# Patient Record
Sex: FEMALE
[De-identification: names, ages, dates, MRNs, and addresses within clinical notes are randomized per-mention and may not be internally consistent; named-entity substitution may affect disease eponyms.]

---

## 2024-08-01 PROBLEM — Z00.00 ENCOUNTER FOR PREVENTIVE HEALTH EXAMINATION: Status: ACTIVE | Noted: 2024-08-01

## 2024-08-08 ENCOUNTER — APPOINTMENT (OUTPATIENT)
Dept: ORTHOPEDIC SURGERY | Facility: CLINIC | Age: 59
End: 2024-08-08

## 2024-08-08 PROBLEM — I10 HBP (HIGH BLOOD PRESSURE): Status: ACTIVE | Noted: 2024-08-08

## 2024-08-08 PROBLEM — M23.91 INTERNAL DERANGEMENT OF RIGHT KNEE: Status: ACTIVE | Noted: 2024-08-08

## 2024-08-08 PROBLEM — F17.210 CIGARETTE SMOKER: Status: ACTIVE | Noted: 2024-08-08

## 2024-08-08 PROBLEM — F32.A DEPRESSION: Status: ACTIVE | Noted: 2024-08-08

## 2024-08-08 PROBLEM — M25.561 RIGHT MEDIAL KNEE PAIN: Status: ACTIVE | Noted: 2024-08-08

## 2024-08-08 PROCEDURE — 73564 X-RAY EXAM KNEE 4 OR MORE: CPT | Mod: RT

## 2024-08-08 PROCEDURE — 73030 X-RAY EXAM OF SHOULDER: CPT | Mod: RT

## 2024-08-08 PROCEDURE — 20610 DRAIN/INJ JOINT/BURSA W/O US: CPT

## 2024-08-08 PROCEDURE — 99204 OFFICE O/P NEW MOD 45 MIN: CPT | Mod: 25

## 2024-08-08 PROCEDURE — 73010 X-RAY EXAM OF SHOULDER BLADE: CPT | Mod: RT

## 2024-08-08 NOTE — DISCUSSION/SUMMARY
[de-identified] : Discussed PT/ CSI R knee Plan for PT If no improvement with inj will call to obtain MRI to r/o MMT ----------------------------------------------------------------------------   All relevant imaging studies pertinent to today's visit, including x-rays, MRI's and/or other advanced imaging studies (CT/etc) were independently interpreted and reviewed with the patient as needed. Implications of the studies together with the patient's clinical picture were discussed to formulate a working diagnosis and management options were detailed.   The patient and/or guardian was advised of the diagnosis.  The natural history of the pathology was explained in full. All questions were answered.  The risks and benefits of conservative and interventional treatment alternatives were explained to the patient  The patient and/or guardian was advised if any advanced diagnostic/imaging study (MRI/CT/etc) is ordered to evaluate potential pathology in the affected area(s), they should follow up in the office to review the results of the study and determine further management that may be indicated.     ----------------------------------------------------------------------------  Large joint corticosteroid injection given: Right knee  Patient indicated for injection after trial of rest, OTC medications including aspirin, Ibuprofen, Aleve etc or prescription NSAIDS, and/or exercises at home and/ or physical therapy without satisfactory response.  Patient has symptoms including pain, swelling, and/or decreased mobility in the joint. The risks, benefits, and alternatives to corticosteroid injection were explained in full to the patient, including but not limited to infection, sepsis, bleeding, scarring, skin discoloration, temporary increase in pain, syncopal episode, failure to resolve symptoms, allergic reaction, symptom recurrence, and elevation of blood sugar in diabetics. Patient understood the risks. All questions were answered. After discussion of options, patient requested an injection.   Oral informed consent was obtained and sterile technique was utilized for the procedure including the preparation of the solutions used for the injection and betadine followed by alcohol prep to the injection site. Anesthesia was given with ethyl chloride sprayed topically. The injection was delivered. Patient tolerated the procedure well.   Post Procedure Instructions: Patient was advised to call if redness, pain, or fever occur and apply ice for 15 min on and 15 min off later today  Medications delivered: Kenalog 10 mg, Lidocaine: 4 cc

## 2024-08-08 NOTE — IMAGING
[All Views] : anteroposterior, lateral, skyline, and anteroposterior standing [Mild tricompartmental OA medial narrowing] : Mild tricompartmental OA medial narrowing [Advanced patellofemoral OA] : Advanced patellofemoral OA [de-identified] :   ----------------------------------------------------------------------------   Right knee exam:   Skin: no significant pertinent finding  Inspection:     (neg) Effusion     (neg) Malalignment     (neg) Swelling     (neg) Quad atrophy     (neg) J-sign  ROM:     0 - 135 degrees of flexion.  Tenderness:     (+) MJLT     (mild) LJLT     (neg) Medial patellar facet tenderness     (neg) Lateral patellar facet tenderness     (+) Crepitus     (min) Patellar grind tenderness     (neg) Patellar tendon     (neg) Quad tendon     Other:  Stability:     (neg) Lachman     (neg) Varus/Valgus instability     (neg) Posterior drawer     (neg) Patellar translation: wnl  Additional tests:     (+) McMurrays test     (neg) Patellar apprehension     Other:  Strength: 5/5 Q/H/TA/GS/EHL  Neuro: In tact to light touch throughout, DTR's wnl  Vascularity: Extremity warm and well perfused  Gait: antalgic      ----------------------------------------------------------------------------   Right shoulder exam:   Skin: no significant pertinent finding Inspection: no obvious deformity, no obvious masses, no swelling, no effusion, no atrophy ROM:    FF: 180    ER: 70    IR: T12 Tenderness:    (neg) Anterior/Biceps:    (neg) Posterior    (neg) Lateral    (neg) Trapezius    (neg) Scapula    (neg) AC joint    (neg) Crepitus with ROM Stability:    (neg) Translation    (neg) Apprehension    (neg) Clicking Additional tests:    (neg) Neer's    (neg) Hawkin's    (neg) Fitzpatrick's    (neg) Speed    (neg) Cross chest adduction Strength:    FF: 5/5    ER: 5/5  + painful    IR: 5/5    Biceps: 5/5    Triceps: 5/5    Distal: 5/5 Neuro: In tact to light touch throughout Vascularity: Extremity warm and well perfused   [Right] : right shoulder [AC Joint Arthrosis] : AC Joint Arthrosis [There are no fractures, subluxations or dislocations. No significant abnormalities are seen] : There are no fractures, subluxations or dislocations. No significant abnormalities are seen

## 2024-08-08 NOTE — HISTORY OF PRESENT ILLNESS
[] : no [de-identified] : MARGARITA HALL  a 59 year F  here for evaluation of her right shoulder and knee. Patient has a previous work shoulder injury from about 10 years ago, pain had started again recently; has since decreased. Pain in the right knee started about a month ago and has worsened over time, pain is worse at night. Patient was seen in the ER a few weeks ago where she had an x-ray and ultrasound at [facility]; was told to follow up with an orthopedist. Tylenol improves discomfort. having intermittent locking sx. pain with twisting/ turning    no dm/ glaucoma

## 2024-09-09 ENCOUNTER — APPOINTMENT (OUTPATIENT)
Dept: ORTHOPEDIC SURGERY | Facility: CLINIC | Age: 59
End: 2024-09-09

## 2024-09-10 ENCOUNTER — APPOINTMENT (OUTPATIENT)
Dept: ORTHOPEDIC SURGERY | Facility: CLINIC | Age: 59
End: 2024-09-10

## 2024-09-12 ENCOUNTER — APPOINTMENT (OUTPATIENT)
Dept: ORTHOPEDIC SURGERY | Facility: CLINIC | Age: 59
End: 2024-09-12
Payer: MEDICAID

## 2024-09-12 VITALS — BODY MASS INDEX: 32.02 KG/M2 | WEIGHT: 174 LBS | HEIGHT: 62 IN

## 2024-09-12 DIAGNOSIS — M25.561 PAIN IN RIGHT KNEE: ICD-10-CM

## 2024-09-12 DIAGNOSIS — M23.91 UNSPECIFIED INTERNAL DERANGEMENT OF RIGHT KNEE: ICD-10-CM

## 2024-09-12 PROCEDURE — 99214 OFFICE O/P EST MOD 30 MIN: CPT

## 2024-09-12 PROCEDURE — 73560 X-RAY EXAM OF KNEE 1 OR 2: CPT | Mod: RT

## 2024-09-12 RX ORDER — DICLOFENAC SODIUM 75 MG/1
75 TABLET, DELAYED RELEASE ORAL
Qty: 20 | Refills: 1 | Status: ACTIVE | COMMUNITY
Start: 2024-09-12 | End: 1900-01-01

## 2024-09-12 NOTE — HISTORY OF PRESENT ILLNESS
[10] : 10 [3] : 3 [de-identified] : MARGARITA HALL  a 59 year F  here for evaluation of her right shoulder and knee. Patient has a previous work shoulder injury from about 10 years ago, pain had started again recently; has since decreased. Pain in the right knee started about a month ago and has worsened over time, pain is worse at night. Patient was seen in the ER a few weeks ago where she had an x-ray and ultrasound at [facility]; was told to follow up with an orthopedist. Tylenol improves discomfort. having intermittent locking sx. pain with twisting/ turning    no dm/ glaucoma  [] : no [de-identified] : PT

## 2024-09-12 NOTE — IMAGING
[AC Joint Arthrosis] : AC Joint Arthrosis [Right] : right knee [All Views] : anteroposterior, lateral, skyline, and anteroposterior standing [There are no fractures, subluxations or dislocations. No significant abnormalities are seen] : There are no fractures, subluxations or dislocations. No significant abnormalities are seen [Mild tricompartmental OA medial narrowing] : Mild tricompartmental OA medial narrowing [de-identified] :   ----------------------------------------------------------------------------   Right knee exam:   Skin: no significant pertinent finding  Inspection:     (mild ) Effusion     (neg) Malalignment     (+) Swelling     (neg) Quad atrophy     (neg) J-sign  ROM:     0 - 30 degrees of flexion.  Tenderness:     (+) MJLT     (mild) LJLT     (neg) Medial patellar facet tenderness     (neg) Lateral patellar facet tenderness     (+) Crepitus     (min) Patellar grind tenderness     (neg) Patellar tendon     (neg) Quad tendon     Other: medial tib plat  Stability:     (neg) Lachman     (+) pain with valgus     (neg) Posterior drawer     (neg) Patellar translation: wnl  Additional tests:     (+) McMurrays test     (neg) Patellar apprehension     Other:  Strength: 5/5 Q/H/TA/GS/EHL  Neuro: In tact to light touch throughout, DTR's wnl  Vascularity: Extremity warm and well perfused  Gait: antalgic with crutches       ----------------------------------------------------------------------------   Right shoulder exam:   Skin: no significant pertinent finding Inspection: no obvious deformity, no obvious masses, no swelling, no effusion, no atrophy ROM:    FF: 180    ER: 70    IR: T12 Tenderness:    (neg) Anterior/Biceps:    (neg) Posterior    (neg) Lateral    (neg) Trapezius    (neg) Scapula    (neg) AC joint    (neg) Crepitus with ROM Stability:    (neg) Translation    (neg) Apprehension    (neg) Clicking Additional tests:    (neg) Neer's    (neg) Hawkin's    (neg) Fitzpatrick's    (neg) Speed    (neg) Cross chest adduction Strength:    FF: 5/5    ER: 5/5  + painful    IR: 5/5    Biceps: 5/5    Triceps: 5/5    Distal: 5/5 Neuro: In tact to light touch throughout Vascularity: Extremity warm and well perfused

## 2024-09-12 NOTE — DISCUSSION/SUMMARY
[Medication Risks Reviewed] : Medication risks reviewed [de-identified] : Pt has locked knee and unable to flex. MRI indicated to r/o internal derangement/loose body/ bucket tear Rx: Diclofenac fu p mri  ----------------------------------------------------------------------------   All relevant imaging studies pertinent to today's visit, including x-rays, MRI's and/or other advanced imaging studies (CT/etc) were independently interpreted and reviewed with the patient as needed. Implications of the studies together with the patient's clinical picture were discussed to formulate a working diagnosis and management options were detailed.   The patient and/or guardian was advised of the diagnosis.  The natural history of the pathology was explained in full. All questions were answered.  The risks and benefits of conservative and interventional treatment alternatives were explained to the patient  The patient and/or guardian was advised if any advanced diagnostic/imaging study (MRI/CT/etc) is ordered to evaluate potential pathology in the affected area(s), they should follow up in the office to review the results of the study and determine further management that may be indicated.     ----------------------------------------------------------------------------  Patient warned of specific risks of medication related to bleeding, GI issues, increase blood pressure, and cardiac risks in addition to additional risks.  Patient advised to discuss with PMD  if any presence of stated issues.

## 2024-09-12 NOTE — REASON FOR VISIT
[FreeTextEntry2] : follow up - RT shoulder RT knee patient fell recently. was doing significantly better with csi and PT until this weekend the knee buckled and has been in severe pain since. went to ED and got immobilizer and crutches. feels she cannot bend knee. feels stuck

## 2024-09-20 ENCOUNTER — APPOINTMENT (OUTPATIENT)
Dept: ORTHOPEDIC SURGERY | Facility: CLINIC | Age: 59
End: 2024-09-20

## 2024-09-24 ENCOUNTER — APPOINTMENT (OUTPATIENT)
Dept: MRI IMAGING | Facility: CLINIC | Age: 59
End: 2024-09-24
Payer: MEDICAID

## 2024-09-24 PROCEDURE — 73721 MRI JNT OF LWR EXTRE W/O DYE: CPT | Mod: RT

## 2024-10-03 ENCOUNTER — APPOINTMENT (OUTPATIENT)
Dept: ORTHOPEDIC SURGERY | Facility: CLINIC | Age: 59
End: 2024-10-03
Payer: MEDICAID

## 2024-10-03 DIAGNOSIS — M23.306 OTHER MENISCUS DERANGEMENTS, UNSPECIFIED MENISCUS, RIGHT KNEE: ICD-10-CM

## 2024-10-03 PROCEDURE — 99214 OFFICE O/P EST MOD 30 MIN: CPT

## 2024-10-03 NOTE — HISTORY OF PRESENT ILLNESS
[10] : 10 [3] : 3 [de-identified] : MARGARITA HALL  a 59 year F  here for evaluation of her right shoulder and knee. Patient has a previous work shoulder injury from about 10 years ago, pain had started again recently; has since decreased. Pain in the right knee started about a month ago and has worsened over time, pain is worse at night. Patient was seen in the ER a few weeks ago where she had an x-ray and ultrasound at [facility]; was told to follow up with an orthopedist. Tylenol improves discomfort. having intermittent locking sx. pain with twisting/ turning    no dm/ glaucoma  [] : no [de-identified] : PT

## 2024-10-03 NOTE — DATA REVIEWED
[MRI] : MRI [Right] : of the right [Knee] : knee [I independently reviewed and interpreted images and report] : I independently reviewed and interpreted images and report [FreeTextEntry1] : complete posterior root medial meniscus tear

## 2024-10-03 NOTE — IMAGING
[AC Joint Arthrosis] : AC Joint Arthrosis [Right] : right knee [All Views] : anteroposterior, lateral, skyline, and anteroposterior standing [There are no fractures, subluxations or dislocations. No significant abnormalities are seen] : There are no fractures, subluxations or dislocations. No significant abnormalities are seen [Mild tricompartmental OA medial narrowing] : Mild tricompartmental OA medial narrowing [de-identified] :   ----------------------------------------------------------------------------   Right knee exam:   Skin: no significant pertinent finding  Inspection:     (min) Effusion     (neg) Malalignment     (+) Swelling     (neg) Quad atrophy     (neg) J-sign  ROM:     0 - 30 degrees of flexion.  Tenderness:     (+) MJLT     (mild) LJLT     (neg) Medial patellar facet tenderness     (neg) Lateral patellar facet tenderness     (+) Crepitus     (min) Patellar grind tenderness     (neg) Patellar tendon     (neg) Quad tendon     Other: medial tib plat  Stability:     (neg) Lachman     (+) pain with valgus     (neg) Posterior drawer     (neg) Patellar translation: wnl  Additional tests:     (+) McMurrays test     (neg) Patellar apprehension     Other:  Strength: 5/5 Q/H/TA/GS/EHL  Neuro: In tact to light touch throughout, DTR's wnl  Vascularity: Extremity warm and well perfused  Gait: antalgic with crutches       ----------------------------------------------------------------------------   Right shoulder exam:   Skin: no significant pertinent finding Inspection: no obvious deformity, no obvious masses, no swelling, no effusion, no atrophy ROM:    FF: 180    ER: 70    IR: T12 Tenderness:    (neg) Anterior/Biceps:    (neg) Posterior    (neg) Lateral    (neg) Trapezius    (neg) Scapula    (neg) AC joint    (neg) Crepitus with ROM Stability:    (neg) Translation    (neg) Apprehension    (neg) Clicking Additional tests:    (neg) Neer's    (neg) Hawkin's    (neg) Fitzpatrick's    (neg) Speed    (neg) Cross chest adduction Strength:    FF: 5/5    ER: 5/5  + painful    IR: 5/5    Biceps: 5/5    Triceps: 5/5    Distal: 5/5 Neuro: In tact to light touch throughout Vascularity: Extremity warm and well perfused

## 2024-10-03 NOTE — REASON FOR VISIT
[FreeTextEntry2] : MRI- RT knee she states she is feeling better but when she walks for about 20 mins she gets some numbness at the base of her rt foot near the toes goes away once she sits down. Overall the knee is doing better thou

## 2024-10-03 NOTE — DISCUSSION/SUMMARY
[Surgical risks reviewed] : Surgical risks reviewed [de-identified] : Reviewed MRI of the R knee  Discussed treatment options for pt's complete medial meniscus posterior root tear including surgical vs nonsurgical management considering she is having no pain in the knee at this point  Discussed risk and benefits of meniscal root repair. Also discussed risk of arthritic progression with and without surgery  Pt wishes to contemplate her options at this time  Plan for PT -------------------------------------------------------------------------------    1. Partial meniscectomy: - Increased risk of arthritis progression - 4-6 week recovery - Meniscus still torn    2. Meniscus root repair: - Increased risk of arthritis progression if it heals - 6 month recovery ( 6-8 weeks non weight bearing) - may not heal    3. Cortisone Injection and Physical Therapy - Increased risk of arthritis progression - May not improve symptoms - May return to work immediately   Risk of knee arthroplasty at some point in the future regardless of treatment due to underlying arthritis currently present ----------------------------------------------------------------------------   All relevant imaging studies pertinent to today's visit, including x-rays, MRI's and/or other advanced imaging studies (CT/etc) were independently interpreted and reviewed with the patient as needed. Implications of the studies together with the patient's clinical picture were discussed to formulate a working diagnosis and management options were detailed.   The patient and/or guardian was advised of the diagnosis.  The natural history of the pathology was explained in full. All questions were answered.  The risks and benefits of conservative and interventional treatment alternatives were explained to the patient  The patient and/or guardian was advised if any advanced diagnostic/imaging study (MRI/CT/etc) is ordered to evaluate potential pathology in the affected area(s), they should follow up in the office to review the results of the study and determine further management that may be indicated.     ----------------------------------------------------------------------------

## 2024-10-31 ENCOUNTER — APPOINTMENT (OUTPATIENT)
Dept: ORTHOPEDIC SURGERY | Facility: CLINIC | Age: 59
End: 2024-10-31